# Patient Record
Sex: MALE | Race: WHITE | NOT HISPANIC OR LATINO | URBAN - METROPOLITAN AREA
[De-identification: names, ages, dates, MRNs, and addresses within clinical notes are randomized per-mention and may not be internally consistent; named-entity substitution may affect disease eponyms.]

---

## 2023-11-21 ENCOUNTER — OFFICE VISIT (OUTPATIENT)
Dept: URBAN - METROPOLITAN AREA CLINIC 7 | Facility: CLINIC | Age: 78
End: 2023-11-21
Payer: COMMERCIAL

## 2023-11-21 ENCOUNTER — LAB OUTSIDE AN ENCOUNTER (OUTPATIENT)
Dept: URBAN - METROPOLITAN AREA CLINIC 7 | Facility: CLINIC | Age: 78
End: 2023-11-21

## 2023-11-21 VITALS
RESPIRATION RATE: 16 BRPM | BODY MASS INDEX: 24.71 KG/M2 | SYSTOLIC BLOOD PRESSURE: 120 MMHG | TEMPERATURE: 97.8 F | WEIGHT: 163 LBS | DIASTOLIC BLOOD PRESSURE: 70 MMHG | HEIGHT: 68 IN

## 2023-11-21 DIAGNOSIS — R10.84 ABDOMINAL PAIN, GENERALIZED: ICD-10-CM

## 2023-11-21 DIAGNOSIS — R19.7 DIARRHEA, UNSPECIFIED: ICD-10-CM

## 2023-11-21 PROCEDURE — 99204 OFFICE O/P NEW MOD 45 MIN: CPT | Performed by: INTERNAL MEDICINE

## 2023-11-21 RX ORDER — DAPAGLIFLOZIN 10 MG/1
1 TABLET TABLET, FILM COATED ORAL ONCE A DAY
Status: DISCONTINUED | COMMUNITY

## 2023-11-21 RX ORDER — MUPIROCIN 20 MG/G
1 APPLICATION OINTMENT TOPICAL TWICE A DAY
Status: DISCONTINUED | COMMUNITY

## 2023-11-21 RX ORDER — CLOTRIMAZOLE AND BETAMETHASONE DIPROPIONATE 10; .5 MG/G; MG/G
1 APPLICATION ON THE SKIN TWICE A DAY SIG: 45 GRAM TUBE CREAM TOPICAL
Qty: 15 GRAM | Refills: 0 | Status: DISCONTINUED | COMMUNITY

## 2023-11-21 RX ORDER — CIPROFLOXACIN AND DEXAMETHASONE 3; 1 MG/ML; MG/ML
INSTILL 4 DROPS INTO AFFECTED EAR EVERY 12 HOURS FOR 7 DAYS SUSPENSION/ DROPS AURICULAR (OTIC)
Qty: 7.5 MILLILITER | Refills: 0 | Status: ACTIVE | COMMUNITY

## 2023-11-21 RX ORDER — CIMETIDINE 400 MG/1
1 TABLET AT BEDTIME TABLET, FILM COATED ORAL ONCE A DAY
Status: DISCONTINUED | COMMUNITY

## 2023-11-21 RX ORDER — SITAGLIPTIN 100 MG/1
1 TABLET TABLET, FILM COATED ORAL ONCE A DAY
Status: DISCONTINUED | COMMUNITY

## 2023-11-21 NOTE — HPI-TODAY'S VISIT:
Patient is new to the practice and is being evaluated for diarrhea and abdominal pain.  Labs in October 2023 demonstrated a creatinine of 0.90, normal FTs, A1c 6.6%, hemoglobin 15.5 with a platelet count of 272 and a normal white count.  He had seen his primary care physician in late October commenting on dark stools for over 6 months.  This was occurring with every bowel movement.  He was thus referred. He had an ear infection, then developed diarrhea. He is going 2-4x per day, urgency at times, darker stools, looser. Sometimes just gas. No iron, no peptobismol. No red bleeding. Doing BRAT diet. Light discomfort in the abdomen. No prior colonoscopy. No fam hx of colon cancer. No surgeries.

## 2023-11-22 ENCOUNTER — TELEPHONE ENCOUNTER (OUTPATIENT)
Dept: URBAN - METROPOLITAN AREA CLINIC 7 | Facility: CLINIC | Age: 78
End: 2023-11-22

## 2023-12-11 LAB
CAMPYLOBACTER SPP. AG,EIA: (no result)
CLOSTRIDIUM DIFFICILE: (no result)
CRYPTOSPORIDIUM ANTIGEN, EIA: (no result)
GIARDIA AG, EIA, STOOL: (no result)
OVA AND PARASITES, CONC AND PERM SMEAR: (no result)
PANCREATIC ELASTASE, FECAL: 281
SALMONELLA AND SHIGELLA, CULTURE: (no result)
SHIGA TOXINS, EIA W/RFL TO E.COLI O157 CULTURE: (no result)

## 2023-12-13 ENCOUNTER — TELEPHONE ENCOUNTER (OUTPATIENT)
Dept: URBAN - METROPOLITAN AREA CLINIC 7 | Facility: CLINIC | Age: 78
End: 2023-12-13

## 2023-12-13 LAB — CALPROTECTIN, FECAL: 44

## 2024-01-30 ENCOUNTER — CLAIMS CREATED FROM THE CLAIM WINDOW (OUTPATIENT)
Dept: URBAN - METROPOLITAN AREA CLINIC 4 | Facility: CLINIC | Age: 79
End: 2024-01-30
Payer: COMMERCIAL

## 2024-01-30 ENCOUNTER — CLAIMS CREATED FROM THE CLAIM WINDOW (OUTPATIENT)
Dept: URBAN - METROPOLITAN AREA SURGERY CENTER 5 | Facility: SURGERY CENTER | Age: 79
End: 2024-01-30
Payer: COMMERCIAL

## 2024-01-30 DIAGNOSIS — K57.30 DIVERTICULOSIS OF LARGE INTESTINE WITHOUT PERFORATION OR ABSCESS WITHOUT BLEEDING: ICD-10-CM

## 2024-01-30 DIAGNOSIS — K62.1 RECTAL POLYP: ICD-10-CM

## 2024-01-30 DIAGNOSIS — K63.5 POLYP OF ASCENDING COLON, UNSPECIFIED TYPE: ICD-10-CM

## 2024-01-30 DIAGNOSIS — D12.0 BENIGN NEOPLASM OF CECUM: ICD-10-CM

## 2024-01-30 DIAGNOSIS — D12.0 ADENOMA OF CECUM: ICD-10-CM

## 2024-01-30 DIAGNOSIS — R19.7 DIARRHEA, UNSPECIFIED TYPE: ICD-10-CM

## 2024-01-30 DIAGNOSIS — D12.2 ADENOMA OF ASCENDING COLON: ICD-10-CM

## 2024-01-30 DIAGNOSIS — D12.3 ADENOMA OF TRANSVERSE COLON: ICD-10-CM

## 2024-01-30 DIAGNOSIS — K64.8 OTHER HEMORRHOIDS: ICD-10-CM

## 2024-01-30 DIAGNOSIS — K62.89 OTHER SPECIFIED DISEASES OF ANUS AND RECTUM: ICD-10-CM

## 2024-01-30 DIAGNOSIS — D12.8 RECTAL ADENOMA: ICD-10-CM

## 2024-01-30 DIAGNOSIS — K57.30 DIVERTICULOSIS OF COLON: ICD-10-CM

## 2024-01-30 PROCEDURE — 45380 COLONOSCOPY AND BIOPSY: CPT | Performed by: INTERNAL MEDICINE

## 2024-01-30 PROCEDURE — 00811 ANES LWR INTST NDSC NOS: CPT | Performed by: NURSE ANESTHETIST, CERTIFIED REGISTERED

## 2024-01-30 PROCEDURE — 88305 TISSUE EXAM BY PATHOLOGIST: CPT | Performed by: PATHOLOGY

## 2024-01-30 PROCEDURE — 45385 COLONOSCOPY W/LESION REMOVAL: CPT | Performed by: INTERNAL MEDICINE

## 2024-01-30 RX ORDER — CIPROFLOXACIN AND DEXAMETHASONE 3; 1 MG/ML; MG/ML
INSTILL 4 DROPS INTO AFFECTED EAR EVERY 12 HOURS FOR 7 DAYS SUSPENSION/ DROPS AURICULAR (OTIC)
Qty: 7.5 MILLILITER | Refills: 0 | Status: ACTIVE | COMMUNITY

## 2024-02-07 PROBLEM — 428283002: Status: ACTIVE | Noted: 2024-02-07

## 2024-02-08 ENCOUNTER — LAB (OUTPATIENT)
Dept: URBAN - METROPOLITAN AREA CLINIC 7 | Facility: CLINIC | Age: 79
End: 2024-02-08

## 2024-02-08 ENCOUNTER — OV EP (OUTPATIENT)
Dept: URBAN - METROPOLITAN AREA CLINIC 7 | Facility: CLINIC | Age: 79
End: 2024-02-08
Payer: COMMERCIAL

## 2024-02-08 VITALS
TEMPERATURE: 97.7 F | WEIGHT: 165 LBS | BODY MASS INDEX: 25.01 KG/M2 | HEIGHT: 68 IN | DIASTOLIC BLOOD PRESSURE: 80 MMHG | SYSTOLIC BLOOD PRESSURE: 122 MMHG

## 2024-02-08 DIAGNOSIS — Z86.010 HISTORY OF COLON POLYPS: ICD-10-CM

## 2024-02-08 DIAGNOSIS — K86.81 EXOCRINE PANCREATIC INSUFFICIENCY: ICD-10-CM

## 2024-02-08 DIAGNOSIS — R10.84 ABDOMINAL PAIN, GENERALIZED: ICD-10-CM

## 2024-02-08 DIAGNOSIS — R19.7 DIARRHEA, UNSPECIFIED: ICD-10-CM

## 2024-02-08 PROCEDURE — 99214 OFFICE O/P EST MOD 30 MIN: CPT | Performed by: INTERNAL MEDICINE

## 2024-02-08 RX ORDER — PANCRELIPASE LIPASE, PANCRELIPASE PROTEASE, PANCRELIPASE AMYLASE 25000; 79000; 105000 [USP'U]/1; [USP'U]/1; [USP'U]/1
AS DIRECTED CAPSULE, DELAYED RELEASE ORAL
Qty: 180 | Refills: 3 | OUTPATIENT
Start: 2024-02-08 | End: 2024-06-07

## 2024-02-08 NOTE — HPI-TODAY'S VISIT:
LV 11/2023. Eval for diarrhea and abdominal pain.  Labs in October 2023 demonstrated a creatinine of 0.90, normal FTs, A1c 6.6%, hemoglobin 15.5 with a platelet count of 272 and a normal white count.  He had seen his primary care physician in late October commenting on dark stools for over 6 months.  This was occurring with every bowel movement.  He was thus referred. He had an ear infection, then developed diarrhea. He is going 2-4x per day, urgency at times, darker stools, looser. Sometimes just gas. No iron, no peptobismol. No red bleeding. Doing BRAT diet. Light discomfort in the abdomen. No prior colonoscopy. No fam hx of colon cancer. No surgeries. Plan was calprotectin, elastase, cdiff, culture, and then proceed with colonoscopy in a 40 min time slot, no prior colonoscopy. Usual bowel habit is slow, once every 1-2 days.  Colonoscopy January 2024 demonstrated 12 polyps which returned as 12 adenomas, moderate diverticulosis, small nonbleeding internal hemorrhoids, and otherwise normal exam with normal terminal ileum with biopsies taken from the colon negative for microscopic colitis.  Stool testing December 2023 did demonstrate a pancreatic elastase of 281, stool negative for infections, calprotectin 44.  Repeat colonoscopy was recommended in 1 year.  I advised continuing fiber supplementation as well as adding pancreatic enzymes prior to meals. Here for discussion of results. He is having better bowel habits post-colonoscopy, not much diarrhea, but before post-meal diarrhea.